# Patient Record
Sex: FEMALE | Race: WHITE
[De-identification: names, ages, dates, MRNs, and addresses within clinical notes are randomized per-mention and may not be internally consistent; named-entity substitution may affect disease eponyms.]

---

## 2019-07-15 ENCOUNTER — HOSPITAL ENCOUNTER (OUTPATIENT)
Dept: HOSPITAL 91 - SDS | Age: 19
Setting detail: OBSERVATION
Discharge: HOME | End: 2019-07-15
Payer: COMMERCIAL

## 2019-07-15 ENCOUNTER — HOSPITAL ENCOUNTER (OUTPATIENT)
Dept: HOSPITAL 10 - SDS | Age: 19
Setting detail: OBSERVATION
Discharge: HOME | End: 2019-07-15
Attending: ORTHOPAEDIC SURGERY | Admitting: ORTHOPAEDIC SURGERY
Payer: COMMERCIAL

## 2019-07-15 VITALS — DIASTOLIC BLOOD PRESSURE: 49 MMHG | HEART RATE: 72 BPM | RESPIRATION RATE: 14 BRPM | SYSTOLIC BLOOD PRESSURE: 114 MMHG

## 2019-07-15 VITALS — DIASTOLIC BLOOD PRESSURE: 50 MMHG | SYSTOLIC BLOOD PRESSURE: 113 MMHG | HEART RATE: 68 BPM | RESPIRATION RATE: 9 BRPM

## 2019-07-15 VITALS — SYSTOLIC BLOOD PRESSURE: 109 MMHG | HEART RATE: 74 BPM | DIASTOLIC BLOOD PRESSURE: 57 MMHG | RESPIRATION RATE: 10 BRPM

## 2019-07-15 VITALS — HEART RATE: 95 BPM | RESPIRATION RATE: 16 BRPM | SYSTOLIC BLOOD PRESSURE: 131 MMHG | DIASTOLIC BLOOD PRESSURE: 69 MMHG

## 2019-07-15 VITALS — DIASTOLIC BLOOD PRESSURE: 61 MMHG | SYSTOLIC BLOOD PRESSURE: 105 MMHG | HEART RATE: 72 BPM | RESPIRATION RATE: 13 BRPM

## 2019-07-15 VITALS — RESPIRATION RATE: 18 BRPM | HEART RATE: 66 BPM | SYSTOLIC BLOOD PRESSURE: 116 MMHG | DIASTOLIC BLOOD PRESSURE: 75 MMHG

## 2019-07-15 VITALS — RESPIRATION RATE: 26 BRPM | DIASTOLIC BLOOD PRESSURE: 54 MMHG | SYSTOLIC BLOOD PRESSURE: 116 MMHG | HEART RATE: 84 BPM

## 2019-07-15 VITALS — RESPIRATION RATE: 18 BRPM | HEART RATE: 74 BPM | SYSTOLIC BLOOD PRESSURE: 118 MMHG | DIASTOLIC BLOOD PRESSURE: 66 MMHG

## 2019-07-15 VITALS — DIASTOLIC BLOOD PRESSURE: 49 MMHG | RESPIRATION RATE: 24 BRPM | HEART RATE: 78 BPM | SYSTOLIC BLOOD PRESSURE: 102 MMHG

## 2019-07-15 VITALS — HEART RATE: 82 BPM | DIASTOLIC BLOOD PRESSURE: 77 MMHG | SYSTOLIC BLOOD PRESSURE: 114 MMHG | RESPIRATION RATE: 16 BRPM

## 2019-07-15 VITALS
BODY MASS INDEX: 17.46 KG/M2 | WEIGHT: 102.29 LBS | HEIGHT: 64 IN | BODY MASS INDEX: 17.46 KG/M2 | WEIGHT: 102.29 LBS | HEIGHT: 64 IN | BODY MASS INDEX: 17.46 KG/M2

## 2019-07-15 VITALS — RESPIRATION RATE: 13 BRPM | DIASTOLIC BLOOD PRESSURE: 58 MMHG | HEART RATE: 70 BPM | SYSTOLIC BLOOD PRESSURE: 112 MMHG

## 2019-07-15 VITALS — HEART RATE: 70 BPM | RESPIRATION RATE: 13 BRPM | SYSTOLIC BLOOD PRESSURE: 101 MMHG | DIASTOLIC BLOOD PRESSURE: 54 MMHG

## 2019-07-15 DIAGNOSIS — M54.5: ICD-10-CM

## 2019-07-15 DIAGNOSIS — M51.17: Primary | ICD-10-CM

## 2019-07-15 PROCEDURE — 99217: CPT

## 2019-07-15 PROCEDURE — 88304 TISSUE EXAM BY PATHOLOGIST: CPT

## 2019-07-15 PROCEDURE — 84703 CHORIONIC GONADOTROPIN ASSAY: CPT

## 2019-07-15 PROCEDURE — 63030 LAMOT DCMPRN NRV RT 1 LMBR: CPT

## 2019-07-15 PROCEDURE — 97161 PT EVAL LOW COMPLEX 20 MIN: CPT

## 2019-07-15 PROCEDURE — G0378 HOSPITAL OBSERVATION PER HR: HCPCS

## 2019-07-15 PROCEDURE — 72050 X-RAY EXAM NECK SPINE 4/5VWS: CPT

## 2019-07-15 RX ADMIN — CEFAZOLIN SODIUM 1 MLS/HR: 2 SOLUTION INTRAVENOUS at 12:57

## 2019-07-15 RX ADMIN — SODIUM CHLORIDE PRN MCG: 9 INJECTION, SOLUTION INTRAVENOUS at 15:13

## 2019-07-15 RX ADMIN — OXYCODONE HYDROCHLORIDE AND ACETAMINOPHEN 1 TAB: 5; 325 TABLET ORAL at 16:24

## 2019-07-15 RX ADMIN — BUPIVACAINE HYDROCHLORIDE AND EPINEPHRINE BITARTRATE 1 ML: 5; .005 INJECTION, SOLUTION EPIDURAL; INTRACAUDAL; PERINEURAL at 14:35

## 2019-07-15 RX ADMIN — FENTANYL CITRATE 1 MCG: 50 INJECTION, SOLUTION INTRAMUSCULAR; INTRAVENOUS at 15:13

## 2019-07-15 RX ADMIN — BETAMETHASONE SODIUM PHOSPHATE AND BETAMETHASONE ACETATE 1 MG: 3; 3 INJECTION, SUSPENSION INTRA-ARTICULAR; INTRALESIONAL; INTRAMUSCULAR at 14:35

## 2019-07-15 RX ADMIN — FENTANYL CITRATE 1 MCG: 50 INJECTION, SOLUTION INTRAMUSCULAR; INTRAVENOUS at 15:02

## 2019-07-15 RX ADMIN — SODIUM CHLORIDE PRN MCG: 9 INJECTION, SOLUTION INTRAVENOUS at 15:02

## 2019-07-15 RX ADMIN — BACITRACIN 1 ML: 50000 INJECTION, POWDER, FOR SOLUTION INTRAMUSCULAR at 14:35

## 2019-07-15 RX ADMIN — GELATIN ABSORBABLE SPONGE SIZE 100 1 SPONGE: MISC at 14:35

## 2019-07-15 RX ADMIN — DIPHENHYDRAMINE HYDROCHLORIDE 1 MG: 50 INJECTION, SOLUTION INTRAMUSCULAR; INTRAVENOUS at 15:02

## 2019-07-15 RX ADMIN — THROMBIN TOPICAL RECOMBINANT 1 UNITS: KIT at 14:35

## 2019-07-15 NOTE — OPR
Date/Time of Note


Date/Time of Note


DATE: 7/15/19 


TIME: 14:32





Operative Report


Free Text/Dictation


DATE OF OPERATION: 07/15/2019





PREOPERATIVE DIAGNOSES: Left sided L5-S1 disk herniation with S1 radiculopathy 





POSTOPERATIVE DIAGNOSES: Left sided L5-S1 disk herniation with S1 radiculopathy 





OPERATION PERFORMED: Left L5-S1 microdiscectomy 





SURGEON: Lisa Perea MD 





ASSISTANT: LUC Srinivasan





ANESTHESIA: General endotracheal 





ESTIMATED BLOOD LOSS: 15 mL 





SURGICAL INDICATION: The patient is a 18 year-old female who presents with a 


history of left lower extremity pain and weakness. She was found to have a disc 


herniation which correlated well with her symptoms. The patient had failed 


conservative treatment. Risks, benefits, and alternatives to microdiscectomy 


were explained to the patient and they wished to proceed. Risks explained 


included but were not exclusive of bleeding, infection, cauda equina syndrome, 


nerve injury, dural tear, iatrogenic instability, recurrent disc herniation, 


fracture, vascular injury, bowel injury, stroke, heart attack and pulmonary 


embolism. 





DESCRIPTION OF TECHNIQUE: The patient was identified in the preoperative area 


and taken to the operating room. Rapid induction of general endotracheal 


anesthesia was performed. The patient was given 2 g of cefazolin for 


prophylaxis. The patient was then placed in the prone position on the Delfin 


frame on a Anthony flat top table with all prominences well padded. The back was


prepped and draped in the usual sterile manner. Using a spinal needle and 


intraoperative fluoroscopy, the appropriate level was clearly identified (L5-


S1). The skin was injected using 0.5% Marcaine with epinephrine. Longitudinal 


midline incision was then created using a 10 blade. Further dissection through 


soft tissue was performed using electrocautery down to the spinous processes. 


The dissection was taken down the left side of the lamina and over the facet 


joint capsule. A self-retaining retractor was applied. Again, intraoperative 


fluoroscopy confirmed the appropriate level. The microscope was brought into use


for microdissection. A small portion of the caudal aspect of the cephalad lamina


and medial facet was resected using a high-speed bur. A series of Kerrison 


rongeurs were then used to resect the ligamentum flavum. The left S1 pedicle was


identified using a hunter. The dura and traversing nerve root were both 


directly visualized. These were retracted gently in a medial direction. 


Immediately, the extruded disc fragment was noted. The pseudo anulus was incised


using an 15 blade. Several loose fragments of disk were removed. These were 


removed back to a stable portion of the disk. A nerve hook and a series of 


pituitaries were used to ensure removal of all loose fragments. The disk space 


was further pressurized using a using normal saline through a syringe to ensure 


that no loose fragments remained behind. Palpation with a ball-tip probe did not


reveal any further stenosis. The traversing left S1 nerve root was noted to be 


significantly decompressed. Meticulous attention was paid towards hemostasis 


using FloSeal. Care was taken to remove all FloSeal prior to wound closure. The 


fascia was then closed using 0 Vicryl in an interrupted fashion. Subcutaneous 


tissue was closed using 2-0 Vicryl in an interrupted fashion. The skin was 


closed using a running 4-0 Monocryl stitch. The wound was dressed using 


Dermabond and a 4x4 sterile gauze. The patient was returned to the supine 


position. He was extubated immediately postoperatively and taken to the recovery


room in stable condition. 





COMPLICATIONS: None.


Procedure Date:  Jul 15, 2019


Preoperative Diagnosis


Left sided L5-S1 disk herniation with S1 radiculopathy


Postoperative Diagnosis


Left sided L5-S1 disk herniation with S1 radiculopathy


Operation/Procedure Performed


Left L5-S1 microdiscectomy


Surgeon


see signature line


Assistant


LUC Srinivasan


Anesthesia Type:  general


Estimated Blood Loss:  10 - 50 ml's


Transfusion


   none


Specimen


L5-S1 disk


Grafts/Implants


none


Complications


none


Pt Condition Post Procedure:  stable


Disposition:  PACU


Procedure Description


DESCRIPTION OF TECHNIQUE: The patient was identified in the preoperative area 


and taken to the operating room. Rapid induction of general endotracheal 


anesthesia was performed. The patient was given 2 g of cefazolin for p


rophylaxis. The patient was then placed in the prone position on the Delfin 


frame on a Anthony flat top table with all prominences well padded. The back was


prepped and draped in the usual sterile manner. Using a spinal needle and 


intraoperative fluoroscopy, the appropriate level was clearly identified (L5-


S1). The skin was injected using 0.5% Marcaine with epinephrine. Longitudinal 


midline incision was then created using a 10 blade. Further dissection through 


soft tissue was performed using electrocautery down to the spinous processes. 


The dissection was taken down the left side of the lamina and over the facet 


joint capsule. A self-retaining retractor was applied. Again, intraoperative 


fluoroscopy confirmed the appropriate level. The microscope was brought into use


for microdissection. A small portion of the caudal aspect of the cephalad lamina


and medial facet was resected using a high-speed bur. A series of Kerrison 


rongeurs were then used to resect the ligamentum flavum. The left S1 pedicle was


identified using a hunter. The dura and traversing nerve root were both 


directly visualized. These were retracted gently in a medial direction. 


Immediately, the extruded disc fragment was noted. The pseudo anulus was incised


using an 15 blade. Several loose fragments of disk were removed. These were 


removed back to a stable portion of the disk. A nerve hook and a series of 


pituitaries were used to ensure removal of all loose fragments. The disk space 


was further pressurized using a using normal saline through a syringe to ensure 


that no loose fragments remained behind. Palpation with a ball-tip probe did not


reveal any further stenosis. The traversing left S1 nerve root was noted to be 


significantly decompressed. Meticulous attention was paid towards hemostasis 


using FloSeal. Care was taken to remove all FloSeal prior to wound closure. The 


fascia was then closed using 0 Vicryl in an interrupted fashion. Subcutaneous 


tissue was closed using 2-0 Vicryl in an interrupted fashion. The skin was 


closed using a running 4-0 Monocryl stitch. The wound was dressed using 


Dermabond and a 4x4 sterile gauze. The patient was returned to the supine 


position. He was extubated immediately postoperatively and taken to the recovery


room in stable condition. 





COMPLICATIONS: None.











LISA PEREA MD          Jul 15, 2019 14:35

## 2019-07-15 NOTE — PAC
Date/Time of Note


Date/Time of Note


DATE: 7/15/19 


TIME: 16:09





Post-Anesthesia Notes


Post-Anesthesia Note


Last documented vital signs





Vital Signs


  Date      Temp  Pulse  Resp  B/P (MAP)   Pulse Ox  O2          O2 Flow    FiO2


Time                                                 Delivery    Rate


   7/15/19           72    13      105/61        97  Room Air


     15:29                           (76)


   7/15/19  98.7


     14:59





Activity:  WNL


Respiratory function:  WNL


Cardiovascular function:  WNL


Mental status:  Baseline


Pain reasonably controlled:  Yes


Hydration appropriate:  Yes


Nausea/Vomiting absent:  Yes


Comments


BT: 98.6











SANTANA ALBARRAN MD               Jul 15, 2019 16:10

## 2019-07-15 NOTE — PDOCDIS
Discharge Instructions


CONDITION


                 [id number]
Patient Condition:  [id number]
Good








HOME CARE INSTRUCTIONS:


                [id number]
Diet Instructions:  [id number]
Regular








ACTIVITY:


        [id number]
Activity Restrictions:  [id number]
Avoid heavy lifting


                                           Avoid Heavy Housework


        [id number]
Bathing Restrictions:   [id number]
Shower








FOLLOW UP/APPOINTMENTS


Follow-up Plan


Follow-up with Dr. Perea in 2 weeks











LISA PEREA MD          Jul 15, 2019 14:39

## 2019-07-15 NOTE — HPN
Date/Time of Note


Date/Time of Note


DATE: 7/15/19 


TIME: 10:51





Interval H&P Admission Note


Pt. seen H&P reviewed:  No system changes











LISA PEREA MD          Jul 15, 2019 10:51